# Patient Record
Sex: FEMALE | Race: WHITE | NOT HISPANIC OR LATINO | Employment: OTHER | URBAN - METROPOLITAN AREA
[De-identification: names, ages, dates, MRNs, and addresses within clinical notes are randomized per-mention and may not be internally consistent; named-entity substitution may affect disease eponyms.]

---

## 2018-12-01 ENCOUNTER — APPOINTMENT (EMERGENCY)
Dept: RADIOLOGY | Facility: HOSPITAL | Age: 63
DRG: 659 | End: 2018-12-01
Payer: MEDICARE

## 2018-12-01 ENCOUNTER — ANESTHESIA EVENT (OUTPATIENT)
Dept: INPATIENT UNIT | Facility: HOSPITAL | Age: 63
End: 2018-12-01

## 2018-12-01 ENCOUNTER — ANESTHESIA (OUTPATIENT)
Dept: INPATIENT UNIT | Facility: HOSPITAL | Age: 63
End: 2018-12-01

## 2018-12-01 ENCOUNTER — ANESTHESIA (INPATIENT)
Dept: PERIOP | Facility: HOSPITAL | Age: 63
DRG: 659 | End: 2018-12-01
Payer: MEDICARE

## 2018-12-01 ENCOUNTER — HOSPITAL ENCOUNTER (INPATIENT)
Facility: HOSPITAL | Age: 63
LOS: 1 days | Discharge: HOME | DRG: 659 | End: 2018-12-02
Attending: EMERGENCY MEDICINE | Admitting: INTERNAL MEDICINE
Payer: MEDICARE

## 2018-12-01 ENCOUNTER — ANESTHESIA EVENT (INPATIENT)
Dept: PERIOP | Facility: HOSPITAL | Age: 63
DRG: 659 | End: 2018-12-01
Payer: MEDICARE

## 2018-12-01 ENCOUNTER — HOSPITAL ENCOUNTER (INPATIENT)
Dept: RADIOLOGY | Facility: HOSPITAL | Age: 63
Discharge: HOME | DRG: 659 | End: 2018-12-01
Payer: MEDICARE

## 2018-12-01 DIAGNOSIS — R41.82 ALTERED MENTAL STATUS: ICD-10-CM

## 2018-12-01 DIAGNOSIS — N13.4 HYDROURETER: ICD-10-CM

## 2018-12-01 DIAGNOSIS — N20.0 KIDNEY STONE: Primary | ICD-10-CM

## 2018-12-01 DIAGNOSIS — N13.30 HYDRONEPHROSIS: ICD-10-CM

## 2018-12-01 DIAGNOSIS — N12 PYELONEPHRITIS: ICD-10-CM

## 2018-12-01 PROBLEM — N31.9 NEUROGENIC BLADDER: Status: ACTIVE | Noted: 2018-12-01

## 2018-12-01 PROBLEM — G93.41 ACUTE METABOLIC ENCEPHALOPATHY: Status: ACTIVE | Noted: 2018-12-01

## 2018-12-01 PROBLEM — N76.3: Status: ACTIVE | Noted: 2018-12-01

## 2018-12-01 PROBLEM — N13.2 HYDRONEPHROSIS WITH URINARY OBSTRUCTION DUE TO RENAL CALCULUS: Status: ACTIVE | Noted: 2018-12-01

## 2018-12-01 LAB
ALBUMIN SERPL BCP-MCNC: 3.6 G/DL (ref 3.5–5)
ALP SERPL-CCNC: 70 U/L (ref 46–116)
ALT SERPL W P-5'-P-CCNC: 29 U/L (ref 12–78)
AMMONIA PLAS-SCNC: <10 UMOL/L (ref 11–35)
AMPHETAMINES SERPL QL SCN: NEGATIVE
ANION GAP SERPL CALCULATED.3IONS-SCNC: 12 MMOL/L (ref 4–13)
APTT PPP: 25 SECONDS (ref 24–33)
AST SERPL W P-5'-P-CCNC: 26 U/L (ref 5–45)
BACTERIA UR QL AUTO: ABNORMAL /HPF
BARBITURATES UR QL: NEGATIVE
BASOPHILS # BLD AUTO: 0.04 THOUSANDS/ΜL (ref 0–0.1)
BASOPHILS NFR BLD AUTO: 0 % (ref 0–1)
BENZODIAZ UR QL: NEGATIVE
BILIRUB SERPL-MCNC: 0.4 MG/DL (ref 0.2–1)
BILIRUB UR QL STRIP: NEGATIVE
BUN SERPL-MCNC: 26 MG/DL (ref 5–25)
CALCIUM SERPL-MCNC: 9.2 MG/DL (ref 8.3–10.1)
CHLORIDE SERPL-SCNC: 103 MMOL/L (ref 100–108)
CLARITY UR: CLEAR
CO2 SERPL-SCNC: 26 MMOL/L (ref 21–32)
COCAINE UR QL: NEGATIVE
COLOR UR: ABNORMAL
CREAT SERPL-MCNC: 0.85 MG/DL (ref 0.6–1.3)
EOSINOPHIL # BLD AUTO: 0.14 THOUSAND/ΜL (ref 0–0.61)
EOSINOPHIL NFR BLD AUTO: 1 % (ref 0–6)
ERYTHROCYTE [DISTWIDTH] IN BLOOD BY AUTOMATED COUNT: 14.2 % (ref 11.6–15.1)
GFR SERPL CREATININE-BSD FRML MDRD: 72 ML/MIN/1.73SQ M
GLUCOSE SERPL-MCNC: 115 MG/DL (ref 65–140)
GLUCOSE UR STRIP-MCNC: NEGATIVE MG/DL
HCT VFR BLD AUTO: 44.6 % (ref 34.8–46.1)
HGB BLD-MCNC: 14.3 G/DL (ref 11.5–15.4)
HGB UR QL STRIP.AUTO: ABNORMAL
IMM GRANULOCYTES # BLD AUTO: 0.04 THOUSAND/UL (ref 0–0.2)
IMM GRANULOCYTES NFR BLD AUTO: 0 % (ref 0–2)
INR PPP: 1.02 (ref 0.86–1.16)
KETONES UR STRIP-MCNC: ABNORMAL MG/DL
LEUKOCYTE ESTERASE UR QL STRIP: NEGATIVE
LYMPHOCYTES # BLD AUTO: 0.94 THOUSANDS/ΜL (ref 0.6–4.47)
LYMPHOCYTES NFR BLD AUTO: 9 % (ref 14–44)
MCH RBC QN AUTO: 29.4 PG (ref 26.8–34.3)
MCHC RBC AUTO-ENTMCNC: 32.1 G/DL (ref 31.4–37.4)
MCV RBC AUTO: 92 FL (ref 82–98)
METHADONE UR QL: NEGATIVE
MONOCYTES # BLD AUTO: 0.62 THOUSAND/ΜL (ref 0.17–1.22)
MONOCYTES NFR BLD AUTO: 6 % (ref 4–12)
NEUTROPHILS # BLD AUTO: 8.5 THOUSANDS/ΜL (ref 1.85–7.62)
NEUTS SEG NFR BLD AUTO: 84 % (ref 43–75)
NITRITE UR QL STRIP: NEGATIVE
NON-SQ EPI CELLS URNS QL MICRO: ABNORMAL /HPF
NRBC BLD AUTO-RTO: 0 /100 WBCS
OPIATES UR QL SCN: NEGATIVE
PCP UR QL: NEGATIVE
PH UR STRIP.AUTO: 5.5 [PH] (ref 5–9)
PLATELET # BLD AUTO: 242 THOUSANDS/UL (ref 149–390)
PMV BLD AUTO: 10.3 FL (ref 8.9–12.7)
POTASSIUM SERPL-SCNC: 4 MMOL/L (ref 3.5–5.3)
PROCALCITONIN SERPL-MCNC: <0.05 NG/ML
PROT SERPL-MCNC: 6.9 G/DL (ref 6.4–8.2)
PROT UR STRIP-MCNC: NEGATIVE MG/DL
PROTHROMBIN TIME: 10.7 SECONDS (ref 9.4–11.7)
RBC # BLD AUTO: 4.87 MILLION/UL (ref 3.81–5.12)
RBC #/AREA URNS AUTO: ABNORMAL /HPF
SODIUM SERPL-SCNC: 141 MMOL/L (ref 136–145)
SP GR UR STRIP.AUTO: 1.01 (ref 1–1.03)
THC UR QL: NEGATIVE
UROBILINOGEN UR QL STRIP.AUTO: 0.2 E.U./DL
WBC # BLD AUTO: 10.28 THOUSAND/UL (ref 4.31–10.16)
WBC #/AREA URNS AUTO: ABNORMAL /HPF

## 2018-12-01 PROCEDURE — 74177 CT ABD & PELVIS W/CONTRAST: CPT

## 2018-12-01 PROCEDURE — 88300 SURGICAL PATH GROSS: CPT | Performed by: PATHOLOGY

## 2018-12-01 PROCEDURE — 70498 CT ANGIOGRAPHY NECK: CPT

## 2018-12-01 PROCEDURE — 85025 COMPLETE CBC W/AUTO DIFF WBC: CPT | Performed by: EMERGENCY MEDICINE

## 2018-12-01 PROCEDURE — 80307 DRUG TEST PRSMV CHEM ANLYZR: CPT | Performed by: STUDENT IN AN ORGANIZED HEALTH CARE EDUCATION/TRAINING PROGRAM

## 2018-12-01 PROCEDURE — 81001 URINALYSIS AUTO W/SCOPE: CPT | Performed by: EMERGENCY MEDICINE

## 2018-12-01 PROCEDURE — 82140 ASSAY OF AMMONIA: CPT | Performed by: EMERGENCY MEDICINE

## 2018-12-01 PROCEDURE — 74018 RADEX ABDOMEN 1 VIEW: CPT

## 2018-12-01 PROCEDURE — 1124F ACP DISCUSS-NO DSCNMKR DOCD: CPT | Performed by: INTERNAL MEDICINE

## 2018-12-01 PROCEDURE — 84145 PROCALCITONIN (PCT): CPT | Performed by: STUDENT IN AN ORGANIZED HEALTH CARE EDUCATION/TRAINING PROGRAM

## 2018-12-01 PROCEDURE — 0TC68ZZ EXTIRPATION OF MATTER FROM RIGHT URETER, VIA NATURAL OR ARTIFICIAL OPENING ENDOSCOPIC: ICD-10-PCS | Performed by: SPECIALIST

## 2018-12-01 PROCEDURE — C1769 GUIDE WIRE: HCPCS | Performed by: SPECIALIST

## 2018-12-01 PROCEDURE — 85610 PROTHROMBIN TIME: CPT | Performed by: EMERGENCY MEDICINE

## 2018-12-01 PROCEDURE — 70496 CT ANGIOGRAPHY HEAD: CPT

## 2018-12-01 PROCEDURE — 85730 THROMBOPLASTIN TIME PARTIAL: CPT | Performed by: EMERGENCY MEDICINE

## 2018-12-01 PROCEDURE — 96361 HYDRATE IV INFUSION ADD-ON: CPT

## 2018-12-01 PROCEDURE — 82360 CALCULUS ASSAY QUANT: CPT | Performed by: SPECIALIST

## 2018-12-01 PROCEDURE — 0T9B70Z DRAINAGE OF BLADDER WITH DRAINAGE DEVICE, VIA NATURAL OR ARTIFICIAL OPENING: ICD-10-PCS | Performed by: SPECIALIST

## 2018-12-01 PROCEDURE — 96360 HYDRATION IV INFUSION INIT: CPT

## 2018-12-01 PROCEDURE — 99223 1ST HOSP IP/OBS HIGH 75: CPT | Performed by: STUDENT IN AN ORGANIZED HEALTH CARE EDUCATION/TRAINING PROGRAM

## 2018-12-01 PROCEDURE — 93005 ELECTROCARDIOGRAM TRACING: CPT

## 2018-12-01 PROCEDURE — 80053 COMPREHEN METABOLIC PANEL: CPT | Performed by: EMERGENCY MEDICINE

## 2018-12-01 PROCEDURE — 0T768DZ DILATION OF RIGHT URETER WITH INTRALUMINAL DEVICE, VIA NATURAL OR ARTIFICIAL OPENING ENDOSCOPIC: ICD-10-PCS | Performed by: SPECIALIST

## 2018-12-01 PROCEDURE — 99285 EMERGENCY DEPT VISIT HI MDM: CPT

## 2018-12-01 PROCEDURE — 82607 VITAMIN B-12: CPT | Performed by: STUDENT IN AN ORGANIZED HEALTH CARE EDUCATION/TRAINING PROGRAM

## 2018-12-01 PROCEDURE — 70450 CT HEAD/BRAIN W/O DYE: CPT

## 2018-12-01 PROCEDURE — 36415 COLL VENOUS BLD VENIPUNCTURE: CPT | Performed by: EMERGENCY MEDICINE

## 2018-12-01 PROCEDURE — 87081 CULTURE SCREEN ONLY: CPT | Performed by: STUDENT IN AN ORGANIZED HEALTH CARE EDUCATION/TRAINING PROGRAM

## 2018-12-01 PROCEDURE — C2617 STENT, NON-COR, TEM W/O DEL: HCPCS | Performed by: SPECIALIST

## 2018-12-01 DEVICE — URETERAL STENT 6 FR X 24CM INLAY: Type: IMPLANTABLE DEVICE | Site: URETER | Status: FUNCTIONAL

## 2018-12-01 RX ORDER — BUPROPION HYDROCHLORIDE 150 MG/1
150 TABLET ORAL DAILY
COMMUNITY

## 2018-12-01 RX ORDER — EMU OIL
1 OIL (ML) MISCELLANEOUS AS NEEDED
Status: DISCONTINUED | OUTPATIENT
Start: 2018-12-01 | End: 2018-12-02 | Stop reason: HOSPADM

## 2018-12-01 RX ORDER — KETOROLAC TROMETHAMINE 30 MG/ML
15 INJECTION, SOLUTION INTRAMUSCULAR; INTRAVENOUS EVERY 6 HOURS PRN
Status: DISCONTINUED | OUTPATIENT
Start: 2018-12-01 | End: 2018-12-02 | Stop reason: HOSPADM

## 2018-12-01 RX ORDER — SODIUM CHLORIDE 9 MG/ML
125 INJECTION, SOLUTION INTRAVENOUS CONTINUOUS
Status: DISCONTINUED | OUTPATIENT
Start: 2018-12-01 | End: 2018-12-02 | Stop reason: HOSPADM

## 2018-12-01 RX ORDER — BUPROPION HYDROCHLORIDE 150 MG/1
150 TABLET ORAL DAILY
Status: DISCONTINUED | OUTPATIENT
Start: 2018-12-01 | End: 2018-12-02 | Stop reason: HOSPADM

## 2018-12-01 RX ORDER — SODIUM CHLORIDE, SODIUM LACTATE, POTASSIUM CHLORIDE, CALCIUM CHLORIDE 600; 310; 30; 20 MG/100ML; MG/100ML; MG/100ML; MG/100ML
INJECTION, SOLUTION INTRAVENOUS CONTINUOUS PRN
Status: DISCONTINUED | OUTPATIENT
Start: 2018-12-01 | End: 2018-12-01 | Stop reason: SURG

## 2018-12-01 RX ORDER — ACETAMINOPHEN 325 MG/1
650 TABLET ORAL EVERY 6 HOURS PRN
Status: DISCONTINUED | OUTPATIENT
Start: 2018-12-01 | End: 2018-12-02 | Stop reason: HOSPADM

## 2018-12-01 RX ORDER — PROPOFOL 10 MG/ML
INJECTION, EMULSION INTRAVENOUS AS NEEDED
Status: DISCONTINUED | OUTPATIENT
Start: 2018-12-01 | End: 2018-12-01 | Stop reason: SURG

## 2018-12-01 RX ORDER — FLUOXETINE HYDROCHLORIDE 20 MG/1
20 CAPSULE ORAL DAILY
Status: DISCONTINUED | OUTPATIENT
Start: 2018-12-01 | End: 2018-12-02 | Stop reason: HOSPADM

## 2018-12-01 RX ORDER — ONDANSETRON 2 MG/ML
4 INJECTION INTRAMUSCULAR; INTRAVENOUS ONCE AS NEEDED
Status: DISCONTINUED | OUTPATIENT
Start: 2018-12-01 | End: 2018-12-01 | Stop reason: HOSPADM

## 2018-12-01 RX ORDER — MIDAZOLAM HYDROCHLORIDE 1 MG/ML
INJECTION INTRAMUSCULAR; INTRAVENOUS AS NEEDED
Status: DISCONTINUED | OUTPATIENT
Start: 2018-12-01 | End: 2018-12-01 | Stop reason: SURG

## 2018-12-01 RX ORDER — KETOROLAC TROMETHAMINE 30 MG/ML
15 INJECTION, SOLUTION INTRAMUSCULAR; INTRAVENOUS EVERY 6 HOURS SCHEDULED
Status: DISCONTINUED | OUTPATIENT
Start: 2018-12-01 | End: 2018-12-01

## 2018-12-01 RX ORDER — MAGNESIUM HYDROXIDE 1200 MG/15ML
LIQUID ORAL AS NEEDED
Status: DISCONTINUED | OUTPATIENT
Start: 2018-12-01 | End: 2018-12-01 | Stop reason: HOSPADM

## 2018-12-01 RX ORDER — DIPHENHYDRAMINE HYDROCHLORIDE 50 MG/ML
12.5 INJECTION INTRAMUSCULAR; INTRAVENOUS ONCE AS NEEDED
Status: DISCONTINUED | OUTPATIENT
Start: 2018-12-01 | End: 2018-12-01 | Stop reason: HOSPADM

## 2018-12-01 RX ORDER — ONDANSETRON 2 MG/ML
INJECTION INTRAMUSCULAR; INTRAVENOUS AS NEEDED
Status: DISCONTINUED | OUTPATIENT
Start: 2018-12-01 | End: 2018-12-01 | Stop reason: SURG

## 2018-12-01 RX ORDER — ONDANSETRON 2 MG/ML
4 INJECTION INTRAMUSCULAR; INTRAVENOUS EVERY 6 HOURS PRN
Status: DISCONTINUED | OUTPATIENT
Start: 2018-12-01 | End: 2018-12-02 | Stop reason: HOSPADM

## 2018-12-01 RX ORDER — DEXAMETHASONE SODIUM PHOSPHATE 4 MG/ML
INJECTION, SOLUTION INTRA-ARTICULAR; INTRALESIONAL; INTRAMUSCULAR; INTRAVENOUS; SOFT TISSUE AS NEEDED
Status: DISCONTINUED | OUTPATIENT
Start: 2018-12-01 | End: 2018-12-01 | Stop reason: SURG

## 2018-12-01 RX ORDER — LEVOFLOXACIN 5 MG/ML
500 INJECTION, SOLUTION INTRAVENOUS ONCE
Status: DISCONTINUED | OUTPATIENT
Start: 2018-12-01 | End: 2018-12-02 | Stop reason: HOSPADM

## 2018-12-01 RX ORDER — EMU OIL
1 OIL (ML) MISCELLANEOUS AS NEEDED
COMMUNITY

## 2018-12-01 RX ORDER — HYDROMORPHONE HCL/PF 1 MG/ML
0.5 SYRINGE (ML) INJECTION
Status: DISCONTINUED | OUTPATIENT
Start: 2018-12-01 | End: 2018-12-01 | Stop reason: HOSPADM

## 2018-12-01 RX ORDER — FLUOXETINE 20 MG/1
20 TABLET, FILM COATED ORAL DAILY
COMMUNITY

## 2018-12-01 RX ADMIN — SODIUM CHLORIDE 125 ML/HR: 0.9 INJECTION, SOLUTION INTRAVENOUS at 14:58

## 2018-12-01 RX ADMIN — ONDANSETRON 4 MG: 2 INJECTION INTRAMUSCULAR; INTRAVENOUS at 18:28

## 2018-12-01 RX ADMIN — SODIUM CHLORIDE 1000 ML: 0.9 INJECTION, SOLUTION INTRAVENOUS at 10:47

## 2018-12-01 RX ADMIN — MIDAZOLAM HYDROCHLORIDE 2 MG: 1 INJECTION, SOLUTION INTRAMUSCULAR; INTRAVENOUS at 18:18

## 2018-12-01 RX ADMIN — PROPOFOL 150 MG: 10 INJECTION, EMULSION INTRAVENOUS at 18:18

## 2018-12-01 RX ADMIN — THIAMINE HYDROCHLORIDE 100 MG: 100 INJECTION, SOLUTION INTRAMUSCULAR; INTRAVENOUS at 14:58

## 2018-12-01 RX ADMIN — HYDROMORPHONE HYDROCHLORIDE 0.5 MG: 1 INJECTION, SOLUTION INTRAMUSCULAR; INTRAVENOUS; SUBCUTANEOUS at 19:11

## 2018-12-01 RX ADMIN — DEXAMETHASONE SODIUM PHOSPHATE 4 MG: 4 INJECTION, SOLUTION INTRA-ARTICULAR; INTRALESIONAL; INTRAMUSCULAR; INTRAVENOUS; SOFT TISSUE at 18:28

## 2018-12-01 RX ADMIN — SODIUM CHLORIDE, SODIUM LACTATE, POTASSIUM CHLORIDE, AND CALCIUM CHLORIDE: .6; .31; .03; .02 INJECTION, SOLUTION INTRAVENOUS at 18:10

## 2018-12-01 RX ADMIN — LEVOFLOXACIN 500 MG: 5 INJECTION, SOLUTION INTRAVENOUS at 18:10

## 2018-12-01 RX ADMIN — IOHEXOL 100 ML: 350 INJECTION, SOLUTION INTRAVENOUS at 11:11

## 2018-12-01 NOTE — LETTER
[address] [facility] [address]  Dept: [phone]    December 2, 2018     Patient: Marcio Johnson   YOB: 1955   Date of Visit: 12/1/2018       To Whom it May Concern:    Tegan Toney came to drive [facility], [facility]  Marcio Johnson is under my professional care and was admitted to the hospital in [address] [place] [address] from 12/1/2018 to 12/02/18  If you have any questions or concerns, please don't hesitate to call           Sincerely,          Camden Randolph MD

## 2018-12-01 NOTE — STROKE DOCUMENTATION
Pt continuing to have difficulty remembering events from last night and this morning  Frequently asking why she is here

## 2018-12-01 NOTE — ED PROVIDER NOTES
History  Chief Complaint   Patient presents with    Abdominal Pain     Pt reports right side abd pain since this morning, did vomit once  Per EMS,  reports pt has been forgetful lately  Patient is a 55-year-old female that is visiting from out of town who developed sudden onset of right side abdominal pain, being was sharp and severe and she went and had an episode of vomiting  The pain has become less intense at this time  Patient denies any fevers or chills, no aggravating or alleviating factors  Upon exam the patient seems quite confused and having some difficulty answering some questions she is awake alert she is not quite oriented to the date but she does know the year  Patient also asking multiple times of her  if she has questions like has she had her appendix out but she is able to tell us about her reconstructive surgery of her breast   So there is definitely some confusion noted  And I spoke to the  the patient states this is brain new, the patient was totally normal 12 hours prior to the sudden onset of abdominal pain and that this confusion is all acute  Talking the patient again she has no headache but the  states she complained of 1 this morning  This been no reported fevers or chills, patient does self cath but no recent UTIs  Prior to Admission Medications   Prescriptions Last Dose Informant Patient Reported? Taking?    FLUoxetine (PROzac) 20 MG tablet 11/30/2018 at Unknown time  Yes Yes   Sig: Take 20 mg by mouth daily   buPROPion (WELLBUTRIN XL) 150 mg 24 hr tablet 11/30/2018 at Unknown time  Yes Yes   Sig: Take 150 mg by mouth daily      Facility-Administered Medications: None       Past Medical History:   Diagnosis Date    [facility] cancer [facility] )     Cancer ([facility] )     breast cancer    Depression     History of chemotherapy 2001    Self-catheterizes urinary bladder     Zoon's vulvitis        Past Surgical History:   Procedure Laterality Date  ANTERIOR CRUCIATE LIGAMENT REPAIR      BREAST SURGERY      CERVICAL FUSION      FRACTURE SURGERY Right     ankle    MASTECTOMY Bilateral     TONSILLECTOMY         Family History   Problem Relation Age of Onset    Heart disease Mother     Mental illness Father     Cancer Brother      I have reviewed and agree with the history as documented  Social History   Substance Use Topics    Smoking status: Never Smoker    Smokeless tobacco: Never Used    Alcohol use No        Review of Systems   Constitutional: Negative for chills and fever  HENT: Negative for facial swelling and trouble swallowing  Eyes: Negative for photophobia  Respiratory: Negative for chest tightness and shortness of breath  Cardiovascular: Negative for chest pain and leg swelling  Gastrointestinal: Positive for abdominal pain, nausea and vomiting  Genitourinary: Negative for dysuria and flank pain  Musculoskeletal: Negative for back pain and neck pain  Skin: Negative  Neurological: Negative for weakness and numbness  Hematological: Negative  Psychiatric/Behavioral: Positive for confusion  Physical Exam  Physical Exam   Constitutional: She appears well-developed and well-nourished  HENT:   Head: Normocephalic and atraumatic  Eyes: Pupils are equal, round, and reactive to light  EOM are normal    Neck: Normal range of motion  Cardiovascular: Normal rate and regular rhythm  Pulmonary/Chest: Effort normal and breath sounds normal    Abdominal: Soft  Normal appearance  She exhibits no distension  There is tenderness in the right lower quadrant  There is rebound  There is no rigidity and no guarding  Musculoskeletal: Normal range of motion  Neurological: She is alert  No cranial nerve deficit or sensory deficit  She exhibits normal muscle tone  GCS eye subscore is 4  GCS verbal subscore is 5  GCS motor subscore is 6  Skin: Skin is warm and dry  Nursing note and vitals reviewed        Vital Signs  ED Triage Vitals   Temperature Pulse Respirations Blood Pressure SpO2   12/01/18 1124 12/01/18 1018 12/01/18 1018 12/01/18 1018 12/01/18 1018   98 4 °F (36 9 °C) 73 18 144/70 98 %      Temp Source Heart Rate Source Patient Position - Orthostatic VS BP Location FiO2 (%)   12/01/18 1124 12/01/18 1018 12/01/18 1018 12/01/18 1018 --   Oral Monitor Lying Left arm       Pain Score       12/01/18 1018       1           Vitals:    12/01/18 1124 12/01/18 1200 12/01/18 1230 12/01/18 1307   BP: 136/74 140/78 137/78 137/72   Pulse: 76 76 74 69   Patient Position - Orthostatic VS:    Lying       Visual Acuity  Visual Acuity      Most Recent Value   L Pupil Size (mm)  4   R Pupil Size (mm)  4          ED Medications  Medications   thiamine (VITAMIN B1) 100 mg in sodium chloride 0 9 % 50 mL IVPB (not administered)   sodium chloride 0 9 % bolus 1,000 mL (1,000 mL Intravenous New Bag 12/1/18 1047)   iohexol (OMNIPAQUE) 350 MG/ML injection (MULTI-DOSE) 100 mL (100 mL Intravenous Given 12/1/18 1111)       Diagnostic Studies  Results Reviewed     Procedure Component Value Units Date/Time    Ammonia [[id number]]  (Abnormal) Collected:  12/01/18 1208    Lab Status:  Final result Specimen:  Blood Updated:  12/01/18 1309     Ammonia <10 (L) umol/L     Urine Microscopic [[id number]]  (Abnormal) Collected:  12/01/18 1200    Lab Status:  Final result Specimen:  Urine from Urine, Straight Cath Updated:  12/01/18 1216     RBC, UA 4-10 (A) /hpf      WBC, UA 0-1 (A) /hpf      Epithelial Cells Occasional /hpf      Bacteria, UA Occasional /hpf     UA w Reflex to Microscopic w Reflex to Culture [[id number]]  (Abnormal) Collected:  12/01/18 1200    Lab Status:  Final result Specimen:  Urine from Urine, Straight Cath Updated:  12/01/18 1207     Color, UA Light Yellow     Clarity, UA Clear     Specific Gravity, UA 1 010     pH, UA 5 5     Leukocytes, UA Negative     Nitrite, UA Negative     Protein, UA Negative mg/dl      Glucose, UA Negative mg/dl Ketones, UA Trace (A) mg/dl      Urobilinogen, UA 0 2 E U /dl      Bilirubin, UA Negative     Blood, UA Moderate (A)    Protime-INR [[id number]]  (Normal) Collected:  12/01/18 1035    Lab Status:  Final result Specimen:  Blood from Arm, Left Updated:  12/01/18 1114     Protime 10 7 seconds      INR 1 02    APTT [[id number]]  (Normal) Collected:  12/01/18 1035    Lab Status:  Final result Specimen:  Blood from Arm, Left Updated:  12/01/18 1114     PTT 25 seconds     Comprehensive metabolic panel [[id number]]  (Abnormal) Collected:  12/01/18 1035    Lab Status:  Final result Specimen:  Blood from Arm, Left Updated:  12/01/18 1103     Sodium 141 mmol/L      Potassium 4 0 mmol/L      Chloride 103 mmol/L      CO2 26 mmol/L      ANION GAP 12 mmol/L      BUN 26 (H) mg/dL      Creatinine 0 85 mg/dL      Glucose 115 mg/dL      Calcium 9 2 mg/dL      AST 26 U/L      ALT 29 U/L      Alkaline Phosphatase 70 U/L      Total Protein 6 9 g/dL      Albumin 3 6 g/dL      Total Bilirubin 0 40 mg/dL      eGFR 72 ml/min/1 73sq m     Narrative:         National Kidney Disease Education Program recommendations are as follows:  GFR calculation is accurate only with a steady state creatinine  Chronic Kidney disease less than 60 ml/min/1 73 sq  meters  Kidney failure less than 15 ml/min/1 73 sq  meters      CBC and differential [[id number]]  (Abnormal) Collected:  12/01/18 1035    Lab Status:  Final result Specimen:  Blood from Arm, Left Updated:  12/01/18 1045     WBC 10 28 (H) Thousand/uL      RBC 4 87 Million/uL      Hemoglobin 14 3 g/dL      Hematocrit 44 6 %      MCV 92 fL      MCH 29 4 pg      MCHC 32 1 g/dL      RDW 14 2 %      MPV 10 3 fL      Platelets 787 Thousands/uL      nRBC 0 /100 WBCs      Neutrophils Relative 84 (H) %      Immat GRANS % 0 %      Lymphocytes Relative 9 (L) %      Monocytes Relative 6 %      Eosinophils Relative 1 %      Basophils Relative 0 %      Neutrophils Absolute 8 50 (H) Thousands/µL      Immature Grans Absolute 0 04 Thousand/uL      Lymphocytes Absolute 0 94 Thousands/µL      Monocytes Absolute 0 62 Thousand/µL      Eosinophils Absolute 0 14 Thousand/µL      Basophils Absolute 0 04 Thousands/µL                  CT abdomen pelvis with contrast   Final Result by Ayse Felton DO (12/01 1139)         1  There is a 5 mm distal right ureteral stone resulting in moderate right hydronephrosis and right hydroureter  2   Normal CT appearance of the appendix  The study was marked in [facility] for immediate notification  Workstation performed: [id number]         CTA stroke alert (head/neck)   Final Result by Ayse Felton DO (12/01 1117)      No significant carotid or vertebral artery stenosis  No large vessel occlusion  No evidence of intracranial stenosis or aneurysm  Workstation performed: [id number]         CT stroke alert brain   Final Result by Ayse Felton DO (12/01 1102)      Normal examination        Findings were directly discussed with Ivania Huynh on 12/1/2018 11:01 AM       Workstation performed: [id number]                    Procedures  Procedures       Phone Contacts  ED Phone Contact    ED Course                   Stroke Assessment     Row Name 12/01/18 1048             NIH Stroke Scale    Interval  --      Level of Consciousness (1a ) 0      LOC Questions (1b ) 0      LOC Commands (1c ) 0      Best Gaze (2 ) 0      Visual (3 ) 0      Facial Palsy (4 ) 0      Motor Arm, Left (5a ) 0      Motor Arm, Right (5b ) 0      Motor Leg, Left (6a ) 0      Motor Leg, Right (6b ) 0      Limb Ataxia (7 ) 0      Sensory (8 ) 0      Best Language (9 ) 0      Dysarthria (10 ) 0      Extinction and Inattention (11 ) (Formerly Neglect) 0      Total 0                First Filed Value   TPA Decision  -- [Patient has no neurologic findings except for some acute confusion like symptoms, the last time seen normal was over 15 hours ago given this very odd presentation of possible stroke in the time  I am not going to give tPA ]                        MDM  Number of Diagnoses or Management Options  Altered mental status:   Hydronephrosis:   Hydroureter:   Kidney stone:   Diagnosis management comments: Because the onset of approximately 15 hours of acute onset of confusion a stroke alert was called, after speaking to the wrong with just with a stroke score of 0 no tPA was recommended to be given  We went on with a scans of his CTA of the head neck and a plane scan of her head and his were normal and a stroke alert was called off  The findings were of a right 5 mm kidney stone with hydroureter and hydronephrosis, the urologist was informed and she might be Stented today or tomorrow  I spoke to the hospitalist she is going to need to be admitted for the stone and for the evaluation of this sudden onset of confusion/delirium  I informed the patient and her  of the findings and the need for hospitalization  The patient unfortunately had to be told several times of the plan but the  states understanding is in agreement with the assessment plan  Amount and/or Complexity of Data Reviewed  Clinical lab tests: ordered and reviewed  Tests in the radiology section of CPT®: ordered and reviewed      The patient presented with a condition in which there was a high probability of imminent or life-threatening deterioration, and critical care services (excluding separately billable procedures) totalled 30-74 minutes          Disposition  Final diagnoses:   Kidney stone   Hydronephrosis   Hydroureter   Altered mental status     Time reflects when diagnosis was documented in both MDM as applicable and the Disposition within this note     Time User Action Codes Description Comment    12/1/2018 12:13 PM Aga Main Add [N20 0] Kidney stone     12/1/2018 12:17 PM [place] Main Add [N13 30] Hydronephrosis     12/1/2018 12:17 PM [place] Main Add [N13 4] Hydroureter 12/1/2018 12:17 PM Conrado Shahid Add [R41 82] Altered mental status       ED Disposition     ED Disposition Condition Comment    Admit  Case was discussed with Dr Katia John and the patient's admission status was agreed to be Admission Status: inpatient status to the service of Dr Katia John   Follow-up Information    None         Current Discharge Medication List      CONTINUE these medications which have NOT CHANGED    Details   buPROPion (WELLBUTRIN XL) 150 mg 24 hr tablet Take 150 mg by mouth daily      FLUoxetine (PROzac) 20 MG tablet Take 20 mg by mouth daily           No discharge procedures on file      ED Provider  Electronically Signed by           Lor Park MD  12/01/18 8763       Lor Park MD  12/01/18 8177

## 2018-12-01 NOTE — ASSESSMENT & PLAN NOTE
· Pt self catheterized BID  · Currently has a lot of discomfort from vulvitis so is hesitant to allow catheterization or morrow placement  · Will restart her emu oil for comfort and try to get morrow placed by urology after the cysto

## 2018-12-01 NOTE — ASSESSMENT & PLAN NOTE
· Patient admitted with  RLQ abdominal pain, N/V  · CT A/P showed a 5mm distal right renal stone with moderate hydronephrosis and hydroureter   Appendix was nml  · No evidence of acute infection  · Admit to inpatient  · Consulted urology, plan for cysto possibly tonight vs tomorrow  · NPO, IV pain meds and IV antiemetic
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
## 2018-12-01 NOTE — ASSESSMENT & PLAN NOTE
· In ED patient with confusion and amnesia as to events over the last 2 days   She was stroke alert   · CT brain stroke alert as nml  · CTA head and neck showed no significant vertebral artery stenosis, no large vessel occlusion  · symptoms may be related to uremia given her neurogenic bladder and renal stone as opposed to CVA  · Ammonia level <10  · UDS negative  · Neuro consulted in ED as part of stroke alert  · Concern for CVA low, more likely metabolic from infection  · b12 level pending  · Thiamine 100mg daily  · MRI brain ordered  · LP to r/o encephalitis if she does not improve
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
## 2018-12-01 NOTE — LETTER
[address] [facility] [address]  Dept: [phone]    December 2, 2018     Patient: Toni Kaufman   YOB: 1955   Date of Visit: 12/1/2018       To Whom it May Concern:    Toni Kaufman is under my professional care  She was seen in the hospital from 12/1/2018   to 12/02/18  Please excuse her daughter from work for that time, She may return to work on 12/3/2018  If you have any questions or concerns, please don't hesitate to call           Sincerely,          Efrem Tiwari MD

## 2018-12-01 NOTE — OP NOTE
OPERATIVE REPORT  PATIENT NAME: Keith Hall    :  1955  MRN: [id number]  Pt Location: [facility] OR ROOM 04    SURGERY DATE: 2018    Surgeon(s) and Role:     * Freddie Rojas MD - Primary    Preop Diagnosis:  Right 6 mm distal ureteral stone    Post-Op Diagnosis Codes:  Right 6 mm distal ureteral stone    Procedure(s) (LRB):  CYSTOSCOPY URETEROSCOPY WITH LITHOTRIPSY HOLMIUM LASER, RETROGRADE PYELOGRAM AND INSERTION STENT URETERAL, STONE BASKET EXTRACTION (Right)    Specimen(s):  ID Type Source Tests Collected by Time Destination   1 : Right ureteral stone Calculus Ureter, Right STONE ANALYSIS, TISSUE EXAM Freddie Rojas MD 2018 1838        Estimated Blood Loss:  1 cc  Minimal    Drains:  Urethral Catheter Latex 16 Fr  (Active)   Number of days: 0       Anesthesia Type:   IV Sedation with Anesthesia    Operative Indications: This 76-year-old female was met at the bedside tonight after being admitted early today through [facility] for severe migraines and right flank pain found on CT scan to have a 5 mm right distal ureteral stone patient is visiting family and getting ready to travel back to [facility] where she resides Monday on discussion with the patient her pain of 10/10 was now down to a 5 patient is an feared that if she tries to attempt medical expulsion therapy while traveling she will need to seek further hospital treatment at this time patient is to go to the OR for cysto ureteroscopy laser basket    Stent  Operative Findings:  Fluoroscopic views confirm severe hydronephrosis of the right ureter  Stone easily found in the distal ureter fragmented with holmium laser and basket extraction  24 cm 6 Kyrgyz stent left  Ott left in light of patient's atonic bladder  Patient to be seen in the office on Monday for cysto stent removal prior to traveling back to [facility]    Complications:   None    Procedure and Technique:  After the patient identified in the holding area consent signed right side marked she was placed in the op suite after anesthesia was induced she was placed on thigh position draped prep standard fashion time-out performed 22 British cystoscope passed through urethra into the bladder fluoroscopic views confirmed severe right hydro pelvis hydroureter down to the distended bladder full contrast the bladder was drained on view of the bladder there was no abnormalities 0 038 wire was passed up the right orifice into the right renal pelvis immediate efflux of hydronephrotic urine was noted the semi rigid ureteral scope was placed into the orifice the stone was in countered fragmented with the laser removed basket over the existing wire 20 4 cm 6 British stent was passed wire removed postop procedure was awakened taken recovery room stable condition       I was present for the entire procedure    Patient Disposition:  PACU     SIGNATURE: Jose Shah MD  DATE: December 1, 2018  TIME: 6:59 PM

## 2018-12-01 NOTE — QUICK NOTE
Stroke alert response note    Alert called: 10:47  Phone response: 10:49  NIHSS per ED - 0  tPA - no, low NIHSS, likely not CVA and out of the window  Patient with RLQ pain and confusion, last normal last night  CT and CTA h/n OK but recommended that she will need an LP to r/o encephalitis with comprehensive PCR, gram stain/culture, routine CSF labs (protein, WBC, glucose, RBC) and MRI brain with and without  Check ammonia and B12  Start thiamine 500 IV Q 8 for now  OK to cancel alert if CTA negative  Call with questions

## 2018-12-01 NOTE — ANESTHESIA PREPROCEDURE EVALUATION
Review of Systems/Medical History  Patient summary reviewed  Chart reviewed      Cardiovascular   Pulmonary       GI/Hepatic            Endo/Other     GYN       Hematology   Musculoskeletal       Neurology    Motor deficit ,    Psychology   Depression ,

## 2018-12-01 NOTE — ANESTHESIA PREPROCEDURE EVALUATION
Review of Systems/Medical History  Patient summary reviewed  Chart reviewed      Cardiovascular  EKG reviewed, Exercise tolerance (METS): >4,     Pulmonary       GI/Hepatic       Kidney stones,   Comment: Neurogenic bladder      Endo/Other     GYN    Breast cancer Right mastectomy and left mastectomy       Hematology   Musculoskeletal    Comment: Cervical fusion C4-5 5-6       Neurology   Psychology   Depression , being treated for depression,              Physical Exam    Airway    Mallampati score: I  TM Distance: >3 FB  Neck ROM: full     Dental       Cardiovascular  Rhythm: regular, Rate: normal,     Pulmonary  Breath sounds clear to auscultation,     Other Findings        Anesthesia Plan  ASA Score- 2 Emergent    Anesthesia Type-   Additional Monitors:   Airway Plan: LMA  Plan Factors-    Induction- intravenous  Postoperative Plan-     Informed Consent- Anesthetic plan and risks discussed with patient

## 2018-12-02 VITALS
OXYGEN SATURATION: 97 % | HEIGHT: 66 IN | BODY MASS INDEX: 21.69 KG/M2 | HEART RATE: 68 BPM | SYSTOLIC BLOOD PRESSURE: 109 MMHG | RESPIRATION RATE: 18 BRPM | DIASTOLIC BLOOD PRESSURE: 58 MMHG | WEIGHT: 135 LBS | TEMPERATURE: 98.4 F

## 2018-12-02 LAB
ANION GAP SERPL CALCULATED.3IONS-SCNC: 10 MMOL/L (ref 4–13)
B-HCG SERPL-ACNC: <2 MIU/ML
BUN SERPL-MCNC: 17 MG/DL (ref 5–25)
CALCIUM SERPL-MCNC: 8.7 MG/DL (ref 8.3–10.1)
CHLORIDE SERPL-SCNC: 106 MMOL/L (ref 100–108)
CO2 SERPL-SCNC: 24 MMOL/L (ref 21–32)
CREAT SERPL-MCNC: 0.72 MG/DL (ref 0.6–1.3)
ERYTHROCYTE [DISTWIDTH] IN BLOOD BY AUTOMATED COUNT: 14.5 % (ref 11.6–15.1)
GFR SERPL CREATININE-BSD FRML MDRD: 88 ML/MIN/1.73SQ M
GLUCOSE SERPL-MCNC: 102 MG/DL (ref 65–140)
HCT VFR BLD AUTO: 41.1 % (ref 34.8–46.1)
HGB BLD-MCNC: 13 G/DL (ref 11.5–15.4)
MCH RBC QN AUTO: 29 PG (ref 26.8–34.3)
MCHC RBC AUTO-ENTMCNC: 31.6 G/DL (ref 31.4–37.4)
MCV RBC AUTO: 92 FL (ref 82–98)
PLATELET # BLD AUTO: 224 THOUSANDS/UL (ref 149–390)
PMV BLD AUTO: 10.8 FL (ref 8.9–12.7)
POTASSIUM SERPL-SCNC: 4.1 MMOL/L (ref 3.5–5.3)
PROCALCITONIN SERPL-MCNC: <0.05 NG/ML
RBC # BLD AUTO: 4.49 MILLION/UL (ref 3.81–5.12)
SODIUM SERPL-SCNC: 140 MMOL/L (ref 136–145)
URATE SERPL-MCNC: 4.8 MG/DL (ref 2–6.8)
VIT B12 SERPL-MCNC: 399 PG/ML (ref 100–900)
WBC # BLD AUTO: 8.33 THOUSAND/UL (ref 4.31–10.16)

## 2018-12-02 PROCEDURE — 84550 ASSAY OF BLOOD/URIC ACID: CPT | Performed by: STUDENT IN AN ORGANIZED HEALTH CARE EDUCATION/TRAINING PROGRAM

## 2018-12-02 PROCEDURE — 99239 HOSP IP/OBS DSCHRG MGMT >30: CPT | Performed by: STUDENT IN AN ORGANIZED HEALTH CARE EDUCATION/TRAINING PROGRAM

## 2018-12-02 PROCEDURE — 84702 CHORIONIC GONADOTROPIN TEST: CPT | Performed by: SPECIALIST

## 2018-12-02 PROCEDURE — 84145 PROCALCITONIN (PCT): CPT | Performed by: STUDENT IN AN ORGANIZED HEALTH CARE EDUCATION/TRAINING PROGRAM

## 2018-12-02 PROCEDURE — 85027 COMPLETE CBC AUTOMATED: CPT | Performed by: STUDENT IN AN ORGANIZED HEALTH CARE EDUCATION/TRAINING PROGRAM

## 2018-12-02 PROCEDURE — 80048 BASIC METABOLIC PNL TOTAL CA: CPT | Performed by: STUDENT IN AN ORGANIZED HEALTH CARE EDUCATION/TRAINING PROGRAM

## 2018-12-02 RX ORDER — LEVOFLOXACIN 500 MG/1
500 TABLET, FILM COATED ORAL EVERY 24 HOURS
Qty: 5 TABLET | Refills: 0 | Status: SHIPPED | OUTPATIENT
Start: 2018-12-02 | End: 2018-12-07

## 2018-12-02 RX ADMIN — SODIUM CHLORIDE 125 ML/HR: 0.9 INJECTION, SOLUTION INTRAVENOUS at 03:18

## 2018-12-02 RX ADMIN — ENOXAPARIN SODIUM 40 MG: 40 INJECTION SUBCUTANEOUS at 09:31

## 2018-12-02 RX ADMIN — THIAMINE HYDROCHLORIDE 100 MG: 100 INJECTION, SOLUTION INTRAMUSCULAR; INTRAVENOUS at 09:35

## 2018-12-02 RX ADMIN — FLUOXETINE 20 MG: 20 CAPSULE ORAL at 09:31

## 2018-12-02 RX ADMIN — BUPROPION HYDROCHLORIDE 150 MG: 150 TABLET, EXTENDED RELEASE ORAL at 09:31
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
## 2018-12-02 NOTE — ASSESSMENT & PLAN NOTE
· Patient admitted with  RLQ abdominal pain, N/V  · CT A/P showed a 5mm distal right renal stone with moderate hydronephrosis and hydroureter   Appendix was nml  · No evidence of acute infection  · Admit to inpatient  · Consulted urology  · Patient underwent cysto with stent placement and stone extraction  · Post op course unremarkable, she was discharged home with PO Levaquin course and follow up with urology  · Of note, patient is from [facility] and may be going back there tomorrow so she may not follow up here but do so when she arrives back in [facility]

## 2018-12-02 NOTE — ASSESSMENT & PLAN NOTE
· In ED patient with confusion and amnesia as to some of the events over the last 2 days  She was stroke alert   · CT brain stroke alert as nml  · CTA head and neck showed no significant vertebral artery stenosis, no large vessel occlusion  · symptoms may be related to uremia given her neurogenic bladder and renal stone as opposed to CVA  · Ammonia level <10  · UDS negative  · Neuro consulted in ED as part of stroke alert  · Concern for CVA low, more likely metabolic from infection  Symptoms resolved  · b12 level wnl  · Discussed with neurology and MRI brain not required

## 2018-12-02 NOTE — ANESTHESIA POSTPROCEDURE EVALUATION
Post-Op Assessment Note      CV Status:  Stable    Mental Status:  Alert and awake    Hydration Status:  Euvolemic    PONV Controlled:  Controlled    Airway Patency:  Patent    Post Op Vitals Reviewed: Yes          Staff: Anesthesiologist           /64 (12/01/18 1915)    Temp     Pulse 70 (12/01/18 1915)   Resp 18 (12/01/18 1915)    SpO2 100 % (12/01/18 1915)

## 2018-12-02 NOTE — DISCHARGE INSTRUCTIONS
Kidney Stones   WHAT YOU NEED TO KNOW:   Kidney stones form in the urinary system when the water and waste in your urine are out of balance  When this happens, certain types of waste crystals separate from the urine  The crystals build up and form kidney stones  You may have 1 or more kidney stones  DISCHARGE INSTRUCTIONS:   Seek care immediately:   · You have vomiting that is not relieved by medicine  Contact your healthcare provider if:   · You have a fever  · You have trouble passing urine  · You see blood in your urine  · You have severe pain  · You have any questions or concerns about your condition or care  Medicines:   · NSAIDs , such as ibuprofen, help decrease swelling, pain, and fever  This medicine is available with or without a doctor's order  NSAIDs can cause stomach bleeding or kidney problems in certain people  If you take blood thinner medicine, always ask your healthcare provider if NSAIDs are safe for you  Always read the medicine label and follow directions  · Prescription medicine  may be given  Ask how to take this medicine safely  · Medicines  to balance your electrolytes may be needed  · Take your medicine as directed  Contact your healthcare provider if you think your medicine is not helping or if you have side effects  Tell him or her if you are allergic to any medicine  Keep a list of the medicines, vitamins, and herbs you take  Include the amounts, and when and why you take them  Bring the list or the pill bottles to follow-up visits  Carry your medicine list with you in case of an emergency  Follow up with your healthcare provider as directed: You may need to return for more tests  Write down your questions so you remember to ask them during your visits  Self-care:   · Drink plenty of liquids  Your healthcare provider may tell you to drink at least 8 to 12 (eight-ounce) cups of liquids each day  This helps flush out the kidney stones when you urinate  Water is the best liquid to drink  · Strain your urine every time you go to the bathroom  Urinate through a strainer or a piece of thin cloth to catch the stones  Take the stones to your healthcare provider so they can be sent to the lab for tests  This will help your healthcare providers plan the best treatment for you  · Eat a variety of healthy foods  Healthy foods include fruits, vegetables, whole-grain breads, low-fat dairy products, beans, and fish  You may need to limit how much sodium (salt) or protein you eat  Ask for information about the best foods for you  · Stay active  Your stones may pass more easily by if you stay active  Ask about the best activities for you  After you pass your kidney stones:  Once you have passed your kidney stones, your healthcare provider may  order a 24-hour urine test  Results from a 24-hour urine test will help your healthcare provider plan ways to prevent more stones from forming  If you are told to do a 24-hour test, your healthcare provider will give you more instructions  © 2017 [employer] Information is for End User's use only and may not be sold, redistributed or otherwise used for commercial purposes  All illustrations and images included in CareNotes® are the copyrighted property of A D A M [employer]  or [employer]  The above information is an  only  It is not intended as medical advice for individual conditions or treatments  Talk to your doctor, nurse or pharmacist before following any medical regimen to see if it is safe and effective for you

## 2018-12-02 NOTE — PROGRESS NOTES
Progress note Urology       Patient: Porfirio Smith   : 1955 Sex: female   MRN: [id number]     CSN: [id number]      Subjective  Feels better        Review of Systems:   Constitutional:  Negative for activity change, fever, chills and diaphoresis  HENT: Negative for hearing loss and trouble swallowing  Eyes: Negative for itching and visual disturbance  Respiratory: Negative for chest tightness and shortness of breath  Cardiovascular: Negative for chest pain, edema  Gastrointestinal: Negative for abdominal distention, na abdominal pain, constipation, diarrhea, Nausea and vomiting  Genitourinary: Negative for decreased urine volume, difficulty urinating, dysuria, enuresis, frequency, hematuria and urgency  Musculoskeletal: Negative for gait problem and myalgias  Neurological: Negative for dizziness and headaches  Hematological: Does not bruise/bleed easily         Historical Information   Past Medical History:   Diagnosis Date    breast cancer     breast cancer    Breast cancer ([facility] [address] [facility]     Depression     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     History of chemotherapy     Neurogenic bladder     Self-catheterizes urinary bladder     Zoon's vulvitis      Past Surgical History:   Procedure Laterality Date    ANTERIOR CRUCIATE LIGAMENT REPAIR      BREAST SURGERY      CERVICAL FUSION      FRACTURE SURGERY Right     ankle    MASTECTOMY Bilateral     TONSILLECTOMY       Social History   History   Alcohol Use No     History   Drug Use No     History   Smoking Status    Never Smoker   Smokeless Tobacco    Never Used     Family History:   Family History   Problem Relation Age of Onset    Heart disease Mother     Mental illness Father     Cancer Brother        Meds/Allergies   Prescriptions Prior to Admission   Medication    buPROPion (WELLBUTRIN XL) 150 mg 24 hr tablet    Emu Oil OIL    FLUoxetine (PROzac) 20 MG tablet     No Known Allergies    Objective   Vitals: /56 (BP Location: Right arm)   Pulse 78   Temp 98 3 °F (36 8 °C) (Oral)   Resp 18   Ht 5' 6" (1 676 m)   Wt 61 2 kg (135 lb)   SpO2 96%   BMI 21 79 kg/m²     Physical Exam:  General Alert awake   Normocephalic atraumatic PERRLA  Lungs clear bilaterally  Cardiac normal S1 normal S2  Abdomen soft, flank pain  Extremities no edema    I/O last 24 hours:   In: 1427 1 [I V :1427 1]  Out: 1350 [Urine:1350]    Invasive Devices     Peripheral Intravenous Line            Peripheral IV 12/01/18 Left Antecubital 1 day          Drain            Urethral Catheter Latex 16 Fr  less than 1 day                    Lab Results: CBC:   Lab Results   Component Value Date    WBC 8 33 12/02/2018    HGB 13 0 12/02/2018    HCT 41 1 12/02/2018    MCV 92 12/02/2018     12/02/2018    MCH 29 0 12/02/2018    MCHC 31 6 12/02/2018    RDW 14 5 12/02/2018    MPV 10 8 12/02/2018    NRBC 0 12/01/2018     CMP:   Lab Results   Component Value Date     12/02/2018    CO2 24 12/02/2018    BUN 17 12/02/2018    CREATININE 0 72 12/02/2018    CALCIUM 8 7 12/02/2018    AST 26 12/01/2018    ALT 29 12/01/2018    ALKPHOS 70 12/01/2018    EGFR 88 12/02/2018     Urinalysis:   Lab Results   Component Value Date    COLORU Light Yellow 12/01/2018    CLARITYU Clear 12/01/2018    SPECGRAV 1 010 12/01/2018    PHUR 5 5 12/01/2018    LEUKOCYTESUR Negative 12/01/2018    NITRITE Negative 12/01/2018    GLUCOSEU Negative 12/01/2018    KETONESU Trace (A) 12/01/2018    BILIRUBINUR Negative 12/01/2018    BLOODU Moderate (A) 12/01/2018     Urine Culture: No results found for: URINECX  PSA: No results found for: PSA        Assessment/ Plan:  S/p cysto/rt ureteroscopy/laser/stent  D/c home  To see in office tomorrow  D/c stent      Kayla Hall MD
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
## 2018-12-03 LAB
ATRIAL RATE: 70 BPM
MRSA NOSE QL CULT: NORMAL
P AXIS: 69 DEGREES
PR INTERVAL: 168 MS
QRS AXIS: 58 DEGREES
QRSD INTERVAL: 74 MS
QT INTERVAL: 438 MS
QTC INTERVAL: 473 MS
T WAVE AXIS: 63 DEGREES
VENTRICULAR RATE: 70 BPM

## 2018-12-03 PROCEDURE — 93010 ELECTROCARDIOGRAM REPORT: CPT | Performed by: INTERNAL MEDICINE

## 2018-12-11 LAB
CA PHOS MFR STONE: 3 %
CALCIUM OXALATE DIHYDRATE MFR STONE IR: 2 %
COLOR STONE: NORMAL
COM MFR STONE: 95 %
COMMENT-STONE3: NORMAL
COMPOSITION: NORMAL
LABORATORY COMMENT REPORT: NORMAL
NIDUS STONE QL: NORMAL
PHOTO: NORMAL
SIZE STONE: NORMAL MM
STONE ANALYSIS-IMP: NORMAL
WT STONE: 17.9 MG

## 2021-08-10 ENCOUNTER — WEB ENCOUNTER (OUTPATIENT)
Dept: URBAN - METROPOLITAN AREA CLINIC 23 | Facility: CLINIC | Age: 66
End: 2021-08-10

## 2021-08-11 ENCOUNTER — OFFICE VISIT (OUTPATIENT)
Dept: URBAN - METROPOLITAN AREA CLINIC 12 | Facility: CLINIC | Age: 66
End: 2021-08-11

## 2021-08-28 ENCOUNTER — TELEPHONE ENCOUNTER (OUTPATIENT)
Dept: URBAN - METROPOLITAN AREA CLINIC 13 | Facility: CLINIC | Age: 66
End: 2021-08-28

## 2021-08-29 ENCOUNTER — TELEPHONE ENCOUNTER (OUTPATIENT)
Dept: URBAN - METROPOLITAN AREA CLINIC 13 | Facility: CLINIC | Age: 66
End: 2021-08-29

## 2021-09-28 ENCOUNTER — OFFICE VISIT (OUTPATIENT)
Dept: URBAN - METROPOLITAN AREA CLINIC 12 | Facility: CLINIC | Age: 66
End: 2021-09-28

## (undated) DEVICE — URETERAL CATH 5 FR

## (undated) DEVICE — FABRIC REINFORCED, SURGICAL GOWN, XL: Brand: CONVERTORS

## (undated) DEVICE — LUBRICANT SURGILUBE TUBE 4 OZ  FLIP TOP

## (undated) DEVICE — CYSTOSCOPY PACK: Brand: CONVERTORS

## (undated) DEVICE — POUCH UR CATCHER STERILE

## (undated) DEVICE — 3M™ STERI-DRAPE™ LARGE X-RAY CASSETTE DRAPE 1009: Brand: STERI-DRAPE™

## (undated) DEVICE — RADIOLOGY STERILE LABELS: Brand: CENTURION

## (undated) DEVICE — NGAGE, NITINOL STONE EXTRACTOR MODIFIED BASKET: Brand: NGAGE

## (undated) DEVICE — GUIDEWIRE STRGHT TIP 0.038 IN SOLO PLUS

## (undated) DEVICE — CYSTO TUBING TUR Y IRRIGATION

## (undated) DEVICE — LASER HOLMIUM FIBER 365 MIC

## (undated) DEVICE — Device

## (undated) DEVICE — SEAL BIOPSY PORT ADJUST F/ACCESSORIES UP TO 3FR

## (undated) DEVICE — POV-IOD SOLUTION 4OZ BT

## (undated) DEVICE — GLOVE SRG BIOGEL 8